# Patient Record
Sex: FEMALE | Race: WHITE | NOT HISPANIC OR LATINO | Employment: FULL TIME | URBAN - METROPOLITAN AREA
[De-identification: names, ages, dates, MRNs, and addresses within clinical notes are randomized per-mention and may not be internally consistent; named-entity substitution may affect disease eponyms.]

---

## 2023-07-03 ENCOUNTER — OFFICE VISIT (OUTPATIENT)
Dept: PRIMARY CARE | Facility: CLINIC | Age: 53
End: 2023-07-03
Payer: COMMERCIAL

## 2023-07-03 VITALS
SYSTOLIC BLOOD PRESSURE: 128 MMHG | BODY MASS INDEX: 33.13 KG/M2 | HEIGHT: 62 IN | DIASTOLIC BLOOD PRESSURE: 64 MMHG | HEART RATE: 60 BPM | OXYGEN SATURATION: 98 % | TEMPERATURE: 98.2 F | WEIGHT: 180 LBS

## 2023-07-03 DIAGNOSIS — R49.9 HOARSENESS OR CHANGING VOICE: Primary | ICD-10-CM

## 2023-07-03 LAB — POC RAPID STREP: NEGATIVE

## 2023-07-03 PROCEDURE — 87880 STREP A ASSAY W/OPTIC: CPT | Performed by: FAMILY MEDICINE

## 2023-07-03 PROCEDURE — 87651 STREP A DNA AMP PROBE: CPT

## 2023-07-03 PROCEDURE — 99213 OFFICE O/P EST LOW 20 MIN: CPT | Performed by: FAMILY MEDICINE

## 2023-07-03 RX ORDER — LURASIDONE HYDROCHLORIDE 20 MG/1
20 TABLET, FILM COATED ORAL DAILY
COMMUNITY

## 2023-07-03 RX ORDER — LAMOTRIGINE 100 MG/1
1 TABLET ORAL DAILY
COMMUNITY

## 2023-07-03 RX ORDER — METHYLPREDNISOLONE 4 MG/1
TABLET ORAL
Qty: 21 TABLET | Refills: 0 | Status: SHIPPED | OUTPATIENT
Start: 2023-07-03 | End: 2023-07-10

## 2023-07-03 ASSESSMENT — ENCOUNTER SYMPTOMS
DIFFICULTY URINATING: 0
SHORTNESS OF BREATH: 0
VOMITING: 0
RHINORRHEA: 1
DIARRHEA: 0
FEVER: 0
WHEEZING: 0
SORE THROAT: 1
COUGH: 0
NAUSEA: 0

## 2023-07-03 NOTE — PROGRESS NOTES
"Subjective   Patient ID: Libby Epperson is a 52 y.o. female who presents for Hoarseness.    Pt here for loss of voice x 07/01/2023  Pt does not have a sore throat.         Review of Systems   Constitutional:  Negative for fever.   HENT:  Positive for rhinorrhea and sore throat. Negative for congestion and ear pain.         Has laryngitis, tonsils are swollen  No family ill, no known contagious exposures. Was in [place] 2 wk ago.   Respiratory:  Negative for cough, shortness of breath and wheezing.    Gastrointestinal:  Negative for diarrhea, nausea and vomiting.   Genitourinary:  Negative for difficulty urinating.       Objective   /64 (BP Location: Right arm, Patient Position: Sitting, BP Cuff Size: Adult)   Pulse 60   Temp 36.8 °C (98.2 °F) (Temporal)   Ht 1.575 m (5' 2\")   Wt 81.6 kg (180 lb)   SpO2 98%   BMI 32.92 kg/m²     Physical Exam  Vitals and nursing note reviewed.   Constitutional:       General: She is not in acute distress.     Appearance: Normal appearance.   HENT:      Head: Normocephalic and atraumatic.      Right Ear: Tympanic membrane, ear canal and external ear normal.      Left Ear: Tympanic membrane, ear canal and external ear normal.      Nose: Congestion present.      Comments: Clear disch     Mouth/Throat:      Mouth: Mucous membranes are moist.      Pharynx: Posterior oropharyngeal erythema present.   Cardiovascular:      Rate and Rhythm: Normal rate and regular rhythm.      Heart sounds: Normal heart sounds.   Pulmonary:      Effort: Pulmonary effort is normal.      Breath sounds: Normal breath sounds.   Skin:     General: Skin is warm and dry.   Neurological:      Mental Status: She is alert.         Assessment/Plan   Problem List Items Addressed This Visit       Hoarseness or changing voice - Primary    Relevant Medications    methylPREDNISolone (Medrol Dospak) 4 mg tablets    Other Relevant Orders    POCT Rapid Strep A manually resulted (Completed)    Group A Streptococcus, " PCR     Pt rst negative  May use saline gargles, throat spray/lozenges  Take med as directed  F/up with pcp if cont symptoms

## 2023-07-04 LAB — GROUP A STREP, PCR: NOT DETECTED

## 2024-04-29 ENCOUNTER — APPOINTMENT (OUTPATIENT)
Dept: OTOLARYNGOLOGY | Facility: CLINIC | Age: 54
End: 2024-04-29
Payer: COMMERCIAL

## 2024-05-01 ENCOUNTER — OFFICE VISIT (OUTPATIENT)
Dept: ORTHOPEDIC SURGERY | Facility: CLINIC | Age: 54
End: 2024-05-01
Payer: COMMERCIAL

## 2024-05-01 DIAGNOSIS — M72.2 PLANTAR FASCIITIS OF LEFT FOOT: Primary | ICD-10-CM

## 2024-05-01 PROCEDURE — 99204 OFFICE O/P NEW MOD 45 MIN: CPT | Performed by: EMERGENCY MEDICINE

## 2024-05-01 RX ORDER — MELOXICAM 15 MG/1
15 TABLET ORAL DAILY
Qty: 30 TABLET | Refills: 0 | Status: SHIPPED | OUTPATIENT
Start: 2024-05-01 | End: 2024-05-31

## 2024-05-01 ASSESSMENT — PAIN DESCRIPTION - DESCRIPTORS: DESCRIPTORS: DULL

## 2024-05-01 ASSESSMENT — PAIN - FUNCTIONAL ASSESSMENT: PAIN_FUNCTIONAL_ASSESSMENT: 0-10

## 2024-05-01 NOTE — PROGRESS NOTES
Subjective    Patient ID: Libby Epperson is a 53 y.o. female.    Chief Complaint: Pain of the Left Foot (NPV - During workout class she thinks she landed wrong and it's been a couple of months. Painful when walking.  )     Last Surgery: No surgery found  Last Surgery Date: No surgery found    Libby is a pleasant 53-year-old female coming in with 2 to 3 months of left heel pain.  She was exercising and came down hard on her heel and has felt like she has had a bruise to her heel since that time.  It is hard to walk.  Her pain is better with heels.  She feels like it is along the insertion of her plantar fascia and not over her bone.  She has not yet tried any medications for the pain.  She has a history of pes planus and does not wear orthotics because of discomfort.  She had similar symptoms in the other foot years ago that were more severe.        Objective   Ortho Exam  Right foot and ankle normal  Left ankle normal.  Left foot normal with inspection without any erythema, bruising, swelling.  She does have tenderness to palpation over the plantar fascia insertion near the heel.  No evidence of infection.  No bony point tenderness to palpation.  Left ankle plantarflexion and dorsiflexion range of motion and strength testing intact.  DP pulse intact.  Brisk cap refill in all toes.  Sensation is intact to light touch over all aspects of the left foot.  Bilateral pes planus      Image Results:  No image results found.  Patient declined x-rays    Assessment/Plan   Encounter Diagnoses:  Plantar fasciitis of left foot    Orders Placed This Encounter    meloxicam (Mobic) 15 mg tablet     No follow-ups on file.    No x-rays were obtained.  Patient wants to hold off for now.  I do think it is most likely plantar fasciitis.  We agreed to a stretching program at home, meloxicam daily, and gel heel inserts for the next 2 weeks.  She will then follow-up with me in 2 weeks to determine her response to this plan.  At that time  we could potentially perform a cortisone injection of her plantar fascia if she is still having pain but we would require x-rays first to make sure that there is no evidence of mass or fracture.  We also discussed PRP versus ultrasound-guided percutaneous needle tenotomy versus eventual surgery if she does not improve.    ** Please excuse any errors in grammar or translation related to this dictation. Voice recognition software was utilized to prepare this document. **       Harpreet Wilson MD  [facility]

## 2024-05-15 ENCOUNTER — HOSPITAL ENCOUNTER (OUTPATIENT)
Dept: RADIOLOGY | Facility: CLINIC | Age: 54
Discharge: HOME | End: 2024-05-15
Payer: COMMERCIAL

## 2024-05-15 ENCOUNTER — HOSPITAL ENCOUNTER (OUTPATIENT)
Dept: RADIOLOGY | Facility: EXTERNAL LOCATION | Age: 54
Discharge: HOME | End: 2024-05-15

## 2024-05-15 ENCOUNTER — OFFICE VISIT (OUTPATIENT)
Dept: ORTHOPEDIC SURGERY | Facility: CLINIC | Age: 54
End: 2024-05-15
Payer: COMMERCIAL

## 2024-05-15 DIAGNOSIS — M72.2 PLANTAR FASCIITIS OF LEFT FOOT: ICD-10-CM

## 2024-05-15 DIAGNOSIS — M76.72 PERONEAL TENDINITIS, LEFT LEG: ICD-10-CM

## 2024-05-15 DIAGNOSIS — M72.2 PLANTAR FASCIITIS OF LEFT FOOT: Primary | ICD-10-CM

## 2024-05-15 PROCEDURE — 1036F TOBACCO NON-USER: CPT | Performed by: EMERGENCY MEDICINE

## 2024-05-15 PROCEDURE — 20551 NJX 1 TENDON ORIGIN/INSJ: CPT | Performed by: EMERGENCY MEDICINE

## 2024-05-15 PROCEDURE — 76942 ECHO GUIDE FOR BIOPSY: CPT | Performed by: EMERGENCY MEDICINE

## 2024-05-15 PROCEDURE — 73620 X-RAY EXAM OF FOOT: CPT | Mod: LT

## 2024-05-15 PROCEDURE — 99214 OFFICE O/P EST MOD 30 MIN: CPT | Performed by: EMERGENCY MEDICINE

## 2024-05-15 RX ORDER — LIDOCAINE HYDROCHLORIDE 10 MG/ML
1 INJECTION INFILTRATION; PERINEURAL
Status: COMPLETED | OUTPATIENT
Start: 2024-05-15 | End: 2024-05-15

## 2024-05-15 RX ORDER — TRIAMCINOLONE ACETONIDE 40 MG/ML
40 INJECTION, SUSPENSION INTRA-ARTICULAR; INTRAMUSCULAR
Status: COMPLETED | OUTPATIENT
Start: 2024-05-15 | End: 2024-05-15

## 2024-05-15 RX ADMIN — TRIAMCINOLONE ACETONIDE 40 MG: 40 INJECTION, SUSPENSION INTRA-ARTICULAR; INTRAMUSCULAR at 08:51

## 2024-05-15 RX ADMIN — LIDOCAINE HYDROCHLORIDE 1 ML: 10 INJECTION INFILTRATION; PERINEURAL at 08:51

## 2024-05-15 ASSESSMENT — PAIN DESCRIPTION - DESCRIPTORS: DESCRIPTORS: ACHING

## 2024-05-15 ASSESSMENT — PAIN SCALES - GENERAL: PAINLEVEL_OUTOF10: 4

## 2024-05-15 ASSESSMENT — PAIN - FUNCTIONAL ASSESSMENT: PAIN_FUNCTIONAL_ASSESSMENT: 0-10

## 2024-05-15 NOTE — PROGRESS NOTES
Subjective    Patient ID: Libby Epperson is a 53 y.o. female.    Chief Complaint: Follow-up of the Left Foot (Stats is a little better still has pain. Wants xray.)     Last Surgery: No surgery found  Last Surgery Date: No surgery found    Libby is a pleasant 53-year-old female coming in with 2 to 3 months of left heel pain.  She was exercising and came down hard on her heel and has felt like she has had a bruise to her heel since that time.  It is hard to walk.  Her pain is better with heels.  She feels like it is along the insertion of her plantar fascia and not over her bone.  She has not yet tried any medications for the pain.  She has a history of pes planus and does not wear orthotics because of discomfort.  She had similar symptoms in the other foot years ago that were more severe. We agreed to a stretching program at home, meloxicam daily, and gel heel inserts for the next 2 weeks.  She will then follow-up with me in 2 weeks to determine her response to this plan.  At that time we could potentially perform a cortisone injection of her plantar fascia if she is still having pain but we would require x-rays first to make sure that there is no evidence of mass or fracture.  We also discussed PRP versus ultrasound-guided percutaneous needle tenotomy versus eventual surgery if she does not improve.    Update on 5/15/2024.  Libby is returning for a follow-up visit for her acute on chronic left heel pain. She says that she is better today but only by about 15 to 20%.  She is still having pain over the left heel where the origin of the plantar fascia is especially on the medial aspect.  She is also having some new discomfort over the base of the fifth metatarsal.  No trauma.  No ankle sprains.  She was able and willing to get x-rays here today.        Objective   Ortho Exam  Right foot and ankle normal  Left ankle normal.  Left foot normal with inspection without any erythema, bruising, swelling.  She does have  tenderness to palpation over the plantar fascia origin near the heel.  No evidence of infection.  No bony point tenderness to palpation although she now does have a little bit of discomfort over the base of the fifth metatarsal.  No overlying bruising or erythema..  Left ankle plantarflexion and dorsiflexion range of motion and strength testing intact.  DP pulse intact.  Brisk cap refill in all toes.  Sensation is intact to light touch over all aspects of the left foot.  Bilateral pes planus      Image Results:  No image results found.  X-rays of the left foot were reviewed and interpreted by me on 5/15/2024 and were grossly unremarkable without any evidence of acute injuries or fractures.  She does have a little bit of bone spurring at the heel where it connects with the Achilles tendon and at the origin of the plantar fascia.  The base of the fifth metatarsal looks normal.    Patient ID: Libby Epperson is a 53 y.o. female.    Tendon Sheath Injection: left plantar fascia on 5/15/2024 8:51 AM  Indications: pain  Details: 22 G needle, ultrasound-guided medial approach  Medications: 40 mg triamcinolone acetonide 40 mg/mL; 1 mL lidocaine 10 mg/mL (1 %)  Outcome: tolerated well, no immediate complications  Procedure, treatment alternatives, risks and benefits explained, specific risks discussed. Consent was given by the patient. Immediately prior to procedure a time out was called to verify the correct patient, procedure, equipment, support staff and site/side marked as required. Patient was prepped and draped in the usual sterile fashion.           Assessment/Plan   Encounter Diagnoses:  Plantar fasciitis of left foot    Peroneal tendinitis, left leg    Orders Placed This Encounter    Tendon Sheath Injection    XR foot left 1-2 views     No follow-ups on file.    We discussed her treatment options and agreed to continue with her current treatment regimen and to also perform an ultrasound-guided cortisone injection here  today of the left plantar fascia.  The patient tolerated the procedure well without any complications and activity modifications were reviewed.  She might have a little bit of peroneal tendinitis as well and can continue Voltaren over that affected area I think that treating the plantar fasciitis today with a cortisone injection will help normalize her gait and should hopefully treat all of the symptoms that she has going on in her foot.  If she does not respond well we could potentially perform PRP or refer her for ultrasound-guided transcutaneous needle tenotomy.    ** Please excuse any errors in grammar or translation related to this dictation. Voice recognition software was utilized to prepare this document. **       Harpreet Wilson MD  [facility]

## 2024-06-12 ENCOUNTER — OFFICE VISIT (OUTPATIENT)
Dept: ORTHOPEDIC SURGERY | Facility: CLINIC | Age: 54
End: 2024-06-12
Payer: COMMERCIAL

## 2024-06-12 DIAGNOSIS — M72.2 PLANTAR FASCIITIS OF LEFT FOOT: Primary | ICD-10-CM

## 2024-06-12 PROCEDURE — 1036F TOBACCO NON-USER: CPT | Performed by: EMERGENCY MEDICINE

## 2024-06-12 PROCEDURE — 99213 OFFICE O/P EST LOW 20 MIN: CPT | Performed by: EMERGENCY MEDICINE

## 2024-06-12 ASSESSMENT — PAIN SCALES - GENERAL: PAINLEVEL_OUTOF10: 0 - NO PAIN

## 2024-06-12 ASSESSMENT — PAIN - FUNCTIONAL ASSESSMENT: PAIN_FUNCTIONAL_ASSESSMENT: NO/DENIES PAIN

## 2024-06-12 NOTE — PROGRESS NOTES
Subjective    Patient ID: Libby Epperson is a 53 y.o. female.    Chief Complaint: Follow-up of the Left Foot (Has CSI last visit which stats it seems to be doing better but states she felt something pop (felt like a rubber band had popped)  but yesterday it was feeling better and wanted to make sure it was ok.  Also stats seems like CSI is wearing off. )     Last Surgery: No surgery found  Last Surgery Date: No surgery found    Libby is a pleasant 53-year-old female coming in with 2 to 3 months of left heel pain.  She was exercising and came down hard on her heel and has felt like she has had a bruise to her heel since that time.  It is hard to walk.  Her pain is better with heels.  She feels like it is along the insertion of her plantar fascia and not over her bone.  She has not yet tried any medications for the pain.  She has a history of pes planus and does not wear orthotics because of discomfort.  She had similar symptoms in the other foot years ago that were more severe. We agreed to a stretching program at home, meloxicam daily, and gel heel inserts for the next 2 weeks.  She will then follow-up with me in 2 weeks to determine her response to this plan.  At that time we could potentially perform a cortisone injection of her plantar fascia if she is still having pain but we would require x-rays first to make sure that there is no evidence of mass or fracture.  We also discussed PRP versus ultrasound-guided percutaneous needle tenotomy versus eventual surgery if she does not improve.    Update on 5/15/2024.  Libby is returning for a follow-up visit for her acute on chronic left heel pain. She says that she is better today but only by about 15 to 20%.  She is still having pain over the left heel where the origin of the plantar fascia is especially on the medial aspect.  She is also having some new discomfort over the base of the fifth metatarsal.  No trauma.  No ankle sprains.  She was able and willing to get  "x-rays here today. We agreed to continue with her current treatment regimen and to also perform an ultrasound-guided cortisone injection here today of the left plantar fascia.  The patient tolerated the procedure well without any complications and activity modifications were reviewed.  She might have a little bit of peroneal tendinitis as well and can continue Voltaren over that affected area I think that treating the plantar fasciitis today with a cortisone injection will help normalize her gait and should hopefully treat all of the symptoms that she has going on in her foot.  If she does not respond well we could potentially perform PRP or refer her for ultrasound-guided transcutaneous needle tenotomy.    Update on 6/12/2024.  Libby is coming back in for a follow-up visit for her acute heel pain.  We performed a cortisone injection for plantar fasciitis using ultrasound on 5/15/2024.  She had been doing very well until over the weekend when she was doing toe raises on Sunday and felt a \"rubber band pop \"along the plantar aspect of the left foot.  Afterwards she had a decent amount of pain and a little bit of swelling with trouble walking for 1 to 2 days.  That is the reason why she made a follow-up appointment to come in and see me.  However today and yesterday all of her symptoms have resolved and she feels much better.  She is wearing flip-flops and ambulating well.  She has no pain or complaints here today.        Objective   Ortho Exam  Right foot and ankle normal  Left ankle normal.  Left foot normal with inspection without any erythema, bruising, swelling.  She no longer has tenderness to palpation over the plantar fascia origin near the heel.  No evidence of infection.   No overlying bruising or erythema. Left ankle plantarflexion and dorsiflexion range of motion and strength testing intact.  DP pulse intact.  Brisk cap refill in all toes.  Sensation is intact to light touch over all aspects of the left " foot.  Bilateral pes planus  Exam has improved      Image Results:  X-rays of the left foot were reviewed and interpreted by me on 5 no new imaging today    Patient ID: Libby Epperson is a 53 y.o. female.    Procedures    Assessment/Plan   Encounter Diagnoses:  Plantar fasciitis of left foot    No orders of the defined types were placed in this encounter.    No follow-ups on file.    We discussed her treatment options and both agreed that overall she is doing much better.  She likely tore her left plantar fascia on Sunday which is why she feels so much better here today.  Since this would be the surgical treatment I explained to her that there is nothing to do at this point and to just monitor her symptoms and follow-up with me as needed if she develops any pain or new complaints.  Overall she is doing great and very pleased with this treatment plan.  No indication for repeat imaging at this time.    ** Please excuse any errors in grammar or translation related to this dictation. Voice recognition software was utilized to prepare this document. **       Harpreet Wilson MD  [facility]

## 2024-08-08 ENCOUNTER — APPOINTMENT (OUTPATIENT)
Dept: PODIATRY | Facility: CLINIC | Age: 54
End: 2024-08-08
Payer: COMMERCIAL

## 2025-02-26 ENCOUNTER — HOSPITAL ENCOUNTER (OUTPATIENT)
Dept: RADIOLOGY | Facility: CLINIC | Age: 55
Discharge: HOME | End: 2025-02-26
Payer: COMMERCIAL

## 2025-02-26 ENCOUNTER — OFFICE VISIT (OUTPATIENT)
Dept: ORTHOPEDIC SURGERY | Facility: CLINIC | Age: 55
End: 2025-02-26
Payer: COMMERCIAL

## 2025-02-26 DIAGNOSIS — M79.672 LEFT FOOT PAIN: ICD-10-CM

## 2025-02-26 DIAGNOSIS — M76.829 POSTERIOR TIBIAL TENDON DYSFUNCTION: ICD-10-CM

## 2025-02-26 DIAGNOSIS — M72.2 PLANTAR FASCIITIS OF LEFT FOOT: Primary | ICD-10-CM

## 2025-02-26 PROCEDURE — 99214 OFFICE O/P EST MOD 30 MIN: CPT | Performed by: PHYSICIAN ASSISTANT

## 2025-02-26 PROCEDURE — 73630 X-RAY EXAM OF FOOT: CPT | Mod: LT

## 2025-02-26 PROCEDURE — 99204 OFFICE O/P NEW MOD 45 MIN: CPT | Performed by: PHYSICIAN ASSISTANT

## 2025-02-26 NOTE — PATIENT INSTRUCTIONS
The stretching program (see handout) should be done for about 10 minutes, three times a day.  You should stretch for 3 times a day for 6 weeks and then daily afterwards to maintain your flexibility.    [ ] If you received a plantar fascial night splint, then it should be worn for 6 weeks nightly and as needed after that period of time.  This is to minimize your stiffness and pain with the first few steps in the morning.    For additional relief, here are some more suggestions.  Lotions such as aspercreme that are available over the counter are helpful to rub in areas of heel pain up to three times daily.  You can also freeze a water bottle and roll the bottom of your foot over it.  Also, a heel cushion or heel cup that is available at local pharmacies or sports stores can be put in your shoe for extra cushioning.     [ ] Finally, if you were given a prescription for orthotics, then once the inserts are made, you can use the orthotics in your shoes to provide support and cushioning.    To help support your foot, you can purchase inserts over the counter. You want to look for full length inserts with a foam arch (not flat gel ones). Brands such as [employer] or others work well. Full length inserts give a little more support than the short ones. But, if you use a full length insert, you often will have to remove the insole that came with the shoe and then put the insert you buy then in the shoe    Please call and schedule an appointment to get fitted or molded for your custom made orthotics (see your prescription for phone number).  You need to bring your prescription with you to your appointment.  Insurance coverage for orthotics varies widely and you can contact your insurance company to find out whether orthotics are covered or not with your plan.  The orthotics company also can give you a cost estimate.     Once your custom made orthotics are made and ready to use, then take out your shoe's insert that it  came with and put the orthotic in your shoe.  Orthotics generally work better in lace-up types of shoes (athletic shoes, clarks, keans, merrells, sketchers, etc.).  Sometimes, to accommodate your orthotic, you may have to wear a ½ to one size bigger shoe.  Your orthotics can be adjusted for comfort.  Your pedorthotist can help with adjustments after you wear your orthotics for an initial period of time.    Follow up as needed

## 2025-02-26 NOTE — PROGRESS NOTES
History of Present Illness  54 y.o.female here for left heel pain  1. Plantar fasciitis of left foot        2. Left foot pain  XR foot left 3+ views      3. Posterior tibial tendon dysfunction          Mechanism of injury: none  Date of Injury/Pain: ongoing for about 8 months  Location of pain: plantar heel  Frequency of Pain: worse with walking or standing; start up pain  Associated symptoms? Swelling.  Previous treatment?  rest    REVIEW OF SYSTEMS  27 point review of systems negative except what is stated in HPI    Constitutional Exam: patient's height and weight reviewed, well-kempt  Psychiatric Exam: alert and oriented x 3, appropriate mood and behavior  Eye Exam: KIM, EOMI  Pulmonary Exam: breathing non-labored, no apparent distress  Lymphatic exam: no appreciable lymphadenopathy in the lower extremities  Cardiovascular exam: DP pulses 2+ bilaterally, PT 2+ bilaterally, toes are pink with good capillary refill, no pitting edema  Skin exam: no open lesions, rashes, abrasions or ulcerations  Neurological exam: sensation to light touch intact in both lower extremities in peripheral and dermatomal distributions (except for any abnormalities noted in musculoskeletal exam)      PHYSICAL EXAM  On examination of the left foot; patient is able to weight bear, no limping gait. pes planus alignment. No swelling, ecchymosis, erythema, lesions, ulcers or atrophy; Normal ROM at 2-5th toe flexion/extension and 1st MTP extension; 5/5 strength with great toe flexion/extension and resisted inversion/eversion); Achilles tightness. No tenderness to palpation over heel and plantar arch. No tenderness to palpation over the base of 1st metatarsal/2nd metatarsal, sesamoids, 5th metatarsal, medial cuneiform, navicular, 1st MTP joint or 2nd or 3rd intermetarsal space.  Neurovascularly intact.  Normal sensation to light touch. normal proprioception. Doralis pedis pulse and posterior tibial pulse 2+ b/l. Good capillary refill.  "negative foot squeeze test, positive \"too many toes sign\", negative calcaneal squeeze test    Contralateral foot: normal pes planus alignment     DATA/RESULTS  I personally reviewed the patient's x-ray images and reports of the left foot. The xrays show no fractures or dislocation.  Normal joint spacing    ASSESSMENT: left foot plantar fasciitis    PLAN: Treatment options were discussed with the patient. The patient can use a plantar night splint to wear to bed for 6 weeks. They can use a frozen water bottle or tennis ball to roll under the arch. They will avoid heels and flats and wear a good supportive shoe.  Patient was given a handout and instructed on an at home stretching program.  They should do these exercises 3 times per day for 6 weeks and then daily. Patient can use OTC aspercream for pain and continue to ice and elevate supported at the calf to reduce swelling. Patient is ambulatory and was given a prescription for orthotics, which are medically necessary due to the patient's medical condition and symptomatic pes planus.  All the patient's questions were answered. The patient agrees with the above plan.  Follow up as needed     "